# Patient Record
(demographics unavailable — no encounter records)

---

## 2025-05-01 NOTE — ASSESSMENT
[FreeTextEntry1] : Impression and plan patient came to the office today to discuss possible surveillance colonoscopy she has a family history of colon cancer and history of previous adenomatous polyp.  The risks benefits alternatives and limitations of procedure were discussed.  Patient will schedule at her convenience perhaps at the 3-year interval.  Further suggestions will follow.

## 2025-05-01 NOTE — PHYSICAL EXAM
[Alert] : alert [Normal Voice/Communication] : normal voice/communication [Healthy Appearing] : healthy appearing [No Acute Distress] : no acute distress [Sclera] : the sclera and conjunctiva were normal [Hearing Threshold Finger Rub Not [place]] : hearing was normal [Normal Lips/Gums] : the lips and gums were normal [Oropharynx] : the oropharynx was normal [Normal Appearance] : the appearance of the neck was normal [No Neck Mass] : no neck mass was observed [No Respiratory Distress] : no respiratory distress [No Acc Muscle Use] : no accessory muscle use [Respiration, Rhythm And Depth] : normal respiratory rhythm and effort [Auscultation Breath Sounds / Voice Sounds] : lungs were clear to auscultation bilaterally [Normal S1, S2] : normal S1 and S2 [Heart Rate And Rhythm] : heart rate was normal and rhythm regular [Murmurs] : no murmurs [Bowel Sounds] : normal bowel sounds [Abdomen Tenderness] : non-tender [No Masses] : no abdominal mass palpated [Abdomen Soft] : soft [Oriented To Time, Place, And Person] : oriented to person, place, and time [] : no hepatosplenomegaly

## 2025-05-01 NOTE — HISTORY OF PRESENT ILLNESS
[FreeTextEntry1] : Patient came to the office today for follow-up and discussion.  She is feeling well with minimal complaints.  She has isolated some foods that are causing her gassy bloating.  The patient has a family history of colon cancer in her mother at age 52.  Patient wishes to schedule her surveillance colonoscopy sometime in the not-too-distant future.  The patient denies current nausea vomiting fever chills rectal bleeding or melena.  She has no cardiac or pulmonary complaints.